# Patient Record
Sex: FEMALE | Race: WHITE | NOT HISPANIC OR LATINO | Employment: FULL TIME | URBAN - METROPOLITAN AREA
[De-identification: names, ages, dates, MRNs, and addresses within clinical notes are randomized per-mention and may not be internally consistent; named-entity substitution may affect disease eponyms.]

---

## 2021-03-13 ENCOUNTER — HOSPITAL ENCOUNTER (EMERGENCY)
Facility: HOSPITAL | Age: 36
Discharge: LEFT BEFORE DISCHARGE | End: 2021-03-13

## 2021-03-13 VITALS — HEART RATE: 88 BPM | OXYGEN SATURATION: 97 % | HEIGHT: 72 IN | TEMPERATURE: 97.2 F | RESPIRATION RATE: 18 BRPM

## 2021-03-13 PROCEDURE — 99211 OFF/OP EST MAY X REQ PHY/QHP: CPT

## 2021-03-13 NOTE — ED TRIAGE NOTES
Pt from home for dog bite. She states the dog is hers.  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

## 2021-03-13 NOTE — ED NOTES
Pt states she wants to go to another hospital.  Pt is alert and oriented with no signs of distress.      Elizabeth Mayorga, RN  03/13/21 0744

## 2024-07-25 ENCOUNTER — HOSPITAL ENCOUNTER (EMERGENCY)
Facility: HOSPITAL | Age: 39
Discharge: HOME | End: 2024-07-25
Attending: EMERGENCY MEDICINE
Payer: COMMERCIAL

## 2024-07-25 VITALS
DIASTOLIC BLOOD PRESSURE: 95 MMHG | OXYGEN SATURATION: 99 % | BODY MASS INDEX: 38.6 KG/M2 | HEART RATE: 92 BPM | RESPIRATION RATE: 16 BRPM | WEIGHT: 285 LBS | TEMPERATURE: 98.5 F | SYSTOLIC BLOOD PRESSURE: 158 MMHG | HEIGHT: 72 IN

## 2024-07-25 DIAGNOSIS — R35.0 URINARY FREQUENCY: Primary | ICD-10-CM

## 2024-07-25 LAB
ALBUMIN SERPL-MCNC: 4.5 G/DL (ref 3.5–5.2)
ALBUMIN/GLOB SERPL: 1.7 G/DL
ALP SERPL-CCNC: 71 U/L (ref 39–117)
ALT SERPL W P-5'-P-CCNC: 14 U/L (ref 1–33)
ANION GAP SERPL CALCULATED.3IONS-SCNC: 8.9 MMOL/L (ref 5–15)
AST SERPL-CCNC: 17 U/L (ref 1–32)
B-HCG UR QL: NEGATIVE
BASOPHILS # BLD AUTO: 0.02 10*3/MM3 (ref 0–0.2)
BASOPHILS NFR BLD AUTO: 0.4 % (ref 0–1.5)
BILIRUB SERPL-MCNC: 0.5 MG/DL (ref 0–1.2)
BILIRUB UR QL STRIP: NEGATIVE
BUN SERPL-MCNC: 16 MG/DL (ref 6–20)
BUN/CREAT SERPL: 18.8 (ref 7–25)
CALCIUM SPEC-SCNC: 9.7 MG/DL (ref 8.6–10.5)
CHLORIDE SERPL-SCNC: 103 MMOL/L (ref 98–107)
CLARITY UR: CLEAR
CO2 SERPL-SCNC: 27.1 MMOL/L (ref 22–29)
COLOR UR: YELLOW
CREAT SERPL-MCNC: 0.85 MG/DL (ref 0.57–1)
DEPRECATED RDW RBC AUTO: 42.2 FL (ref 37–54)
EGFRCR SERPLBLD CKD-EPI 2021: 88.9 ML/MIN/1.73
EOSINOPHIL # BLD AUTO: 0.03 10*3/MM3 (ref 0–0.4)
EOSINOPHIL NFR BLD AUTO: 0.6 % (ref 0.3–6.2)
ERYTHROCYTE [DISTWIDTH] IN BLOOD BY AUTOMATED COUNT: 12.1 % (ref 12.3–15.4)
FLUAV SUBTYP SPEC NAA+PROBE: NOT DETECTED
FLUBV RNA ISLT QL NAA+PROBE: NOT DETECTED
GLOBULIN UR ELPH-MCNC: 2.6 GM/DL
GLUCOSE SERPL-MCNC: 111 MG/DL (ref 65–99)
GLUCOSE UR STRIP-MCNC: NEGATIVE MG/DL
HCT VFR BLD AUTO: 41.8 % (ref 34–46.6)
HGB BLD-MCNC: 13.7 G/DL (ref 12–15.9)
HGB UR QL STRIP.AUTO: NEGATIVE
IMM GRANULOCYTES # BLD AUTO: 0.01 10*3/MM3 (ref 0–0.05)
IMM GRANULOCYTES NFR BLD AUTO: 0.2 % (ref 0–0.5)
KETONES UR QL STRIP: NEGATIVE
LEUKOCYTE ESTERASE UR QL STRIP.AUTO: NEGATIVE
LIPASE SERPL-CCNC: 35 U/L (ref 13–60)
LYMPHOCYTES # BLD AUTO: 1.54 10*3/MM3 (ref 0.7–3.1)
LYMPHOCYTES NFR BLD AUTO: 29.1 % (ref 19.6–45.3)
MCH RBC QN AUTO: 30.8 PG (ref 26.6–33)
MCHC RBC AUTO-ENTMCNC: 32.8 G/DL (ref 31.5–35.7)
MCV RBC AUTO: 93.9 FL (ref 79–97)
MONOCYTES # BLD AUTO: 0.32 10*3/MM3 (ref 0.1–0.9)
MONOCYTES NFR BLD AUTO: 6 % (ref 5–12)
NEUTROPHILS NFR BLD AUTO: 3.37 10*3/MM3 (ref 1.7–7)
NEUTROPHILS NFR BLD AUTO: 63.7 % (ref 42.7–76)
NITRITE UR QL STRIP: NEGATIVE
PH UR STRIP.AUTO: 6.5 [PH] (ref 5–8)
PLATELET # BLD AUTO: 264 10*3/MM3 (ref 140–450)
PMV BLD AUTO: 9.4 FL (ref 6–12)
POTASSIUM SERPL-SCNC: 4 MMOL/L (ref 3.5–5.2)
PROT SERPL-MCNC: 7.1 G/DL (ref 6–8.5)
PROT UR QL STRIP: NEGATIVE
RBC # BLD AUTO: 4.45 10*6/MM3 (ref 3.77–5.28)
SARS-COV-2 RNA RESP QL NAA+PROBE: NOT DETECTED
SODIUM SERPL-SCNC: 139 MMOL/L (ref 136–145)
SP GR UR STRIP: 1.01 (ref 1–1.03)
UROBILINOGEN UR QL STRIP: NORMAL
WBC NRBC COR # BLD AUTO: 5.29 10*3/MM3 (ref 3.4–10.8)

## 2024-07-25 PROCEDURE — 99283 EMERGENCY DEPT VISIT LOW MDM: CPT

## 2024-07-25 PROCEDURE — 81025 URINE PREGNANCY TEST: CPT | Performed by: NURSE PRACTITIONER

## 2024-07-25 PROCEDURE — 87636 SARSCOV2 & INF A&B AMP PRB: CPT | Performed by: NURSE PRACTITIONER

## 2024-07-25 PROCEDURE — 80053 COMPREHEN METABOLIC PANEL: CPT | Performed by: NURSE PRACTITIONER

## 2024-07-25 PROCEDURE — 83690 ASSAY OF LIPASE: CPT | Performed by: NURSE PRACTITIONER

## 2024-07-25 PROCEDURE — 85025 COMPLETE CBC W/AUTO DIFF WBC: CPT | Performed by: NURSE PRACTITIONER

## 2024-07-25 PROCEDURE — 81003 URINALYSIS AUTO W/O SCOPE: CPT | Performed by: NURSE PRACTITIONER

## 2024-07-25 PROCEDURE — 99283 EMERGENCY DEPT VISIT LOW MDM: CPT | Performed by: NURSE PRACTITIONER

## 2024-07-25 RX ORDER — SODIUM CHLORIDE 0.9 % (FLUSH) 0.9 %
10 SYRINGE (ML) INJECTION AS NEEDED
Status: DISCONTINUED | OUTPATIENT
Start: 2024-07-25 | End: 2024-07-25 | Stop reason: HOSPADM

## 2024-07-25 NOTE — Clinical Note
[facility]  [address]  [place] [address]    Ximena Cruz was seen and treated in our emergency department on 7/25/2024.  She may return to work on 07/26/2024.         Thank you for choosing [facility].    Angie Dai APRN

## 2024-07-25 NOTE — DISCHARGE INSTRUCTIONS
Urinalysis normal today.  No evidence of UTI    Basic labs normal.    Rest today, drink plenty of fluids.    Return Precautions    Although you are being discharged from the ED today, I encourage you to return for worsening symptoms.  Things can, and do, change such that treatment at home with medication may not be adequate.      Specifically, return for any of the following:    Chest pain, shortness of breath, pain or nausea and vomiting not controlled by medications provided.    Please make a follow up with your Primary Care Provider for a blood pressure recheck.

## 2024-07-25 NOTE — FSED PROVIDER NOTE
EMERGENCY DEPARTMENT ENCOUNTER    Room Number:  12/12  Date seen:  7/25/2024  Time seen: 11:52 EDT  PCP: Marian Graham APRN  Historian: patient    Discussed/obtained information from independent historians: n/a    HPI:  Chief complaint:urinary frequency  A complete HPI/ROS/PMH/PSH/SH/FH are unobtainable due to: n/a  Context:Ximena Cruz is a 38 y.o. female who presents to the ED with c/o acute onset of urinary frequency that she noticed this am. States last night she had very mild lower abdominal discomfort and today she has had low grade fever and just doesn't feel right.  Denies vomiting, diarrhea, headache/body aches. Did a covid test at home PTA and it was negative.  Denies vaginal discharge or concern for pregnancy.     External (non-ED) record review: Patient followed by nephrologyDr. Dao due to h/o renal angiomyolipoma.      Chronic or social conditions impacting care: n/a    ALLERGIES  Patient has no known allergies.    PAST MEDICAL HISTORY  Active Ambulatory Problems     Diagnosis Date Noted    No Active Ambulatory Problems     Resolved Ambulatory Problems     Diagnosis Date Noted    No Resolved Ambulatory Problems     No Additional Past Medical History       PAST SURGICAL HISTORY  Past Surgical History:   Procedure Laterality Date    TONSILLECTOMY      WISDOM TOOTH EXTRACTION         FAMILY HISTORY  History reviewed. No pertinent family history.    SOCIAL HISTORY  Social History     Socioeconomic History    Marital status:    Tobacco Use    Smoking status: Never       REVIEW OF SYSTEMS  Review of Systems    All systems reviewed and negative except for those discussed in HPI.     PHYSICAL EXAM    I have reviewed the triage vital signs and nursing notes.  Vitals:    07/25/24 1154   BP: 158/95   Pulse: 92   Resp: 16   Temp: 98.5 °F (36.9 °C)   SpO2: 99%     Physical Exam    GENERAL: not distressed  HENT: nares patent  EYES: no scleral icterus  NECK: no ROM limitations  CV: regular  rhythm, regular rate  RESPIRATORY: normal effort  ABDOMEN: soft, no focal tenderness.  No guarding, rebound.  : deferred  MUSCULOSKELETAL: no deformity  NEURO: alert, moves all extremities, follows commands  SKIN: warm, dry    LAB RESULTS  Recent Results (from the past 24 hour(s))   Pregnancy, Urine - Urine, Clean Catch    Collection Time: 07/25/24 11:51 AM    Specimen: Urine, Clean Catch   Result Value Ref Range    HCG, Urine QL Negative Negative   Urinalysis With Culture If Indicated - Urine, Clean Catch    Collection Time: 07/25/24 11:51 AM    Specimen: Urine, Clean Catch   Result Value Ref Range    Color, UA Yellow Yellow, Straw    Appearance, UA Clear Clear    pH, UA 6.5 5.0 - 8.0    Specific Gravity, UA 1.015 1.005 - 1.030    Glucose, UA Negative Negative    Ketones, UA Negative Negative    Bilirubin, UA Negative Negative    Blood, UA Negative Negative    Protein, UA Negative Negative    Leuk Esterase, UA Negative Negative    Nitrite, UA Negative Negative    Urobilinogen, UA 0.2 E.U./dL 0.2 - 1.0 E.U./dL   Comprehensive Metabolic Panel    Collection Time: 07/25/24 12:20 PM    Specimen: Blood   Result Value Ref Range    Glucose 111 (H) 65 - 99 mg/dL    BUN 16 6 - 20 mg/dL    Creatinine 0.85 0.57 - 1.00 mg/dL    Sodium 139 136 - 145 mmol/L    Potassium 4.0 3.5 - 5.2 mmol/L    Chloride 103 98 - 107 mmol/L    CO2 27.1 22.0 - 29.0 mmol/L    Calcium 9.7 8.6 - 10.5 mg/dL    Total Protein 7.1 6.0 - 8.5 g/dL    Albumin 4.5 3.5 - 5.2 g/dL    ALT (SGPT) 14 1 - 33 U/L    AST (SGOT) 17 1 - 32 U/L    Alkaline Phosphatase 71 39 - 117 U/L    Total Bilirubin 0.5 0.0 - 1.2 mg/dL    Globulin 2.6 gm/dL    A/G Ratio 1.7 g/dL    BUN/Creatinine Ratio 18.8 7.0 - 25.0    Anion Gap 8.9 5.0 - 15.0 mmol/L    eGFR 88.9 >60.0 mL/min/1.73   Lipase    Collection Time: 07/25/24 12:20 PM    Specimen: Blood   Result Value Ref Range    Lipase 35 13 - 60 U/L   CBC Auto Differential    Collection Time: 07/25/24 12:20 PM    Specimen: Blood    Result Value Ref Range    WBC 5.29 3.40 - 10.80 10*3/mm3    RBC 4.45 3.77 - 5.28 10*6/mm3    Hemoglobin 13.7 12.0 - 15.9 g/dL    Hematocrit 41.8 34.0 - 46.6 %    MCV 93.9 79.0 - 97.0 fL    MCH 30.8 26.6 - 33.0 pg    MCHC 32.8 31.5 - 35.7 g/dL    RDW 12.1 (L) 12.3 - 15.4 %    RDW-SD 42.2 37.0 - 54.0 fl    MPV 9.4 6.0 - 12.0 fL    Platelets 264 140 - 450 10*3/mm3    Neutrophil % 63.7 42.7 - 76.0 %    Lymphocyte % 29.1 19.6 - 45.3 %    Monocyte % 6.0 5.0 - 12.0 %    Eosinophil % 0.6 0.3 - 6.2 %    Basophil % 0.4 0.0 - 1.5 %    Immature Grans % 0.2 0.0 - 0.5 %    Neutrophils, Absolute 3.37 1.70 - 7.00 10*3/mm3    Lymphocytes, Absolute 1.54 0.70 - 3.10 10*3/mm3    Monocytes, Absolute 0.32 0.10 - 0.90 10*3/mm3    Eosinophils, Absolute 0.03 0.00 - 0.40 10*3/mm3    Basophils, Absolute 0.02 0.00 - 0.20 10*3/mm3    Immature Grans, Absolute 0.01 0.00 - 0.05 10*3/mm3   COVID-19 and FLU A/B PCR, 1 HR TAT - Swab, Nasopharynx    Collection Time: 07/25/24 12:21 PM    Specimen: Nasopharynx; Swab   Result Value Ref Range    COVID19 Not Detected Not Detected - Ref. Range    Influenza A PCR Not Detected Not Detected    Influenza B PCR Not Detected Not Detected       Ordered the above labs and independently interpreted results.  My findings will be discussed in the ED course or medical decision making section below      PROGRESS, DATA ANALYSIS, CONSULTS AND MEDICAL DECISION MAKING    Please note that this section constitutes my independent interpretation of clinical data including lab results, radiology, EKG's.  This constitutes my independent professional opinion regarding differential diagnosis and management of this patient.  It may include any factors such as history from outside sources, review of external records, social determinants of health, management of medications, response to those treatments, and discussions with other providers.    ED Course as of 07/25/24 1303   Thu Jul 25, 2024   1250 COVID19: Not Detected [EW]   1250  Influenza A PCR: Not Detected [EW]   1250 Influenza B PCR: Not Detected [EW]   1257 WBC: 5.29 [EW]      ED Course User Index  [EW] Angie Dai APRN     Orders placed during this visit:  Orders Placed This Encounter   Procedures    COVID-19 and FLU A/B PCR, 1 HR TAT - Swab, Nasopharynx    Pregnancy, Urine - Urine, Clean Catch    Urinalysis With Culture If Indicated - Urine, Clean Catch    Comprehensive Metabolic Panel    Lipase    CBC Auto Differential    Insert peripheral IV    Blood Draw With IV Start    CBC & Differential    ED Acknowledgement Form Needed;            Medical Decision Making  Problems Addressed:  Urinary frequency: complicated acute illness or injury    Amount and/or Complexity of Data Reviewed  Labs: ordered. Decision-making details documented in ED Course.    Risk  Prescription drug management.    Patient presents with urinary frequency that started today.  I considered she could have a UTI.  She has no other symptoms.  She states last night she did have some vague lower abdominal discomfort but it was very mild.  She also states that in anticipation of cutting the grass she drank extra water yesterday which could attribute to her urinary frequency.  Urinalysis is completely negative.  We did go ahead and check basic labs again which were all reassuring.  COVID flu negative.  Patient has stable vital signs and will be discharged        DIAGNOSIS  Final diagnoses:   Urinary frequency          Medication List      No changes were made to your prescriptions during this visit.         FOLLOW-UP  Marian Graham APRN  [address], [place] [address]  [phone]    Schedule an appointment as soon as possible for a visit in 1 day  As needed, If symptoms worsen        Latest Documented Vital Signs:  As of 13:03 EDT  BP- 158/95 HR- 92 Temp- 98.5 °F (36.9 °C) (Oral) O2 sat- 99%    Appropriate PPE utilized throughout this patient encounter to include mask, if indicated, per  current protocol. Hand hygiene was performed before donning PPE and after removal when leaving the room.    Please note that portions of this were completed with a voice recognition program.     Note Disclaimer: At [facility], we believe that sharing information builds trust and better relationships. You are receiving this note because you are receiving care at [facility] or recently visited. It is possible you will see health information before a provider has talked with you about it. This kind of information can be easy to misunderstand. To help you fully understand what it means for your health, we urge you to discuss this note with your provider.

## 2025-06-11 ENCOUNTER — OFFICE VISIT (OUTPATIENT)
Dept: FAMILY MEDICINE CLINIC | Facility: CLINIC | Age: 40
End: 2025-06-11
Payer: COMMERCIAL

## 2025-06-11 VITALS
SYSTOLIC BLOOD PRESSURE: 130 MMHG | DIASTOLIC BLOOD PRESSURE: 90 MMHG | TEMPERATURE: 96.4 F | OXYGEN SATURATION: 98 % | HEIGHT: 72 IN | WEIGHT: 293 LBS | HEART RATE: 78 BPM | RESPIRATION RATE: 16 BRPM | BODY MASS INDEX: 39.68 KG/M2

## 2025-06-11 DIAGNOSIS — D17.71 RENAL ANGIOLIPOMA: ICD-10-CM

## 2025-06-11 DIAGNOSIS — J45.990 EXERCISE-INDUCED ASTHMA: ICD-10-CM

## 2025-06-11 DIAGNOSIS — F41.9 ANXIETY: Primary | ICD-10-CM

## 2025-06-11 DIAGNOSIS — R03.0 WHITE COAT SYNDROME WITHOUT DIAGNOSIS OF HYPERTENSION: ICD-10-CM

## 2025-06-11 DIAGNOSIS — Z00.00 ROUTINE ADULT HEALTH MAINTENANCE: ICD-10-CM

## 2025-06-11 DIAGNOSIS — J30.2 SEASONAL ALLERGIES: ICD-10-CM

## 2025-06-11 RX ORDER — ALBUTEROL SULFATE 90 UG/1
2 INHALANT RESPIRATORY (INHALATION) EVERY 4 HOURS PRN
COMMUNITY

## 2025-06-11 NOTE — PROGRESS NOTES
Chief Complaint   Patient presents with    [facility]     Pt here to establish care, no other concerns, needing pap and physical in September      History of Present Illness:    History of Present Illness  The patient is a 39-year-old female who presents to [facility].    She has been diagnosed with an angiomyolipoma on her kidney and was under the care of Dr. Diane for approximately one year. Periodic ultrasounds confirmed the stability of the condition, and she was advised that future blood work would indicate any necessary follow-up. This advice was given 6 months to a year ago.    She experiences anxiety, which has been intermittently treated, but she is not currently on any medication. She has previously taken Lexapro for anxiety but discontinued it several years ago. Currently, she reports feeling well and has not sought further treatment. She has consulted with an online doctor regarding energy work as an alternative to medication, which she prefers. She has been practicing emotional freedom techniques and various breathing exercises, which she finds beneficial.    She has a history of migraines, for which her previous physician prescribed Relpax as needed. She still has the original prescription from 2 years ago and has only taken 2 tablets, as the migraines do not follow a consistent pattern.    She also has seasonal allergies, for which she takes Claritin as needed and uses Flonase.     She has experienced elevated blood pressure during doctor's visits, likely due to anxiety. Dr. Diane recommended home monitoring of her blood pressure for 3 months at various times of the day, but no consistent trends were observed. Her mother has hypertension and is on medication for it.    She has experienced episodes of abdominal pain and nausea, which were previously attributed to inflammation and treated with pantoprazole. She took this medication for a few weeks until symptoms resolved. It was later  determined that the turmeric in a supplement she was taking may have been the trigger. A similar episode occurred a year prior when she was consuming coffee and lentil soup, leading to the suspicion that a foreign body may have been lodged and subsequently passed.    She experiences exercise-induced asthma, characterized by coughing and chest tightness during running or jogging in dry conditions. These symptoms are relieved by using an albuterol inhaler.    She maintains a regular exercise routine, aiming for 10,000 steps daily as part of a workplace competition. She is making efforts to improve her diet, focusing on protein and salads, and is working on mindful eating practices. She is up to date with her dental exams, which she attends every 6 months at [facility]. She also sees an eye doctor annually and a specialist for a small retinal tear. Her last mammogram was in mid-December 2024, and the previous one was in December 2023. Her last Pap smear was in 2022, and she is due for another this year. She is uncertain about her hepatitis C screening status. She reports normal bowel movements without diarrhea, constipation, or blood in stool. She takes a probiotic supplement for digestive health.     ROS: She reports no urinary symptoms or joint pain or swelling. She has been experiencing soreness in her arm for about a month, which she attributes to a pulled muscle, and is managing with a brace.    FAMILY HISTORY  Her mother has high blood pressure and is on medication for it.      Outpatient Medications Prior to Visit   Medication Sig Dispense Refill    albuterol sulfate  (90 Base) MCG/ACT inhaler Inhale 2 puffs Every 4 (Four) Hours As Needed for Wheezing.      fluticasone (FLONASE) 50 MCG/ACT nasal spray       Vienva 0.1-20 MG-MCG per tablet       acetaminophen-codeine (TYLENOL/CODEINE #4) 300-60 MG per tablet Take 1 tablet by mouth Every 6 (Six) Hours As Needed for Moderate Pain . 12  "tablet 0    escitalopram (LEXAPRO) 20 MG tablet       Fexofenadine HCl (ALLEGRA PO) Take  by mouth.      fluticasone (FLONASE) 50 MCG/ACT nasal spray Administer 2 sprays into the nostril(s) as directed by provider Daily for 30 days. 16 g 0    loratadine (Claritin) 10 MG tablet Take 1 tablet by mouth Daily.       No facility-administered medications prior to visit.      Allergies   Allergen Reactions    Tetrahydrozoline-Zn Sulfate Itching and Other (See Comments)     Past Surgical History:   Procedure Laterality Date    TONSILLECTOMY      WISDOM TOOTH EXTRACTION       family history includes Anxiety disorder in her father; Asthma in her father; Cancer in her paternal grandfather; Heart disease in her paternal grandfather; Hypertension in her mother; Liver disease in her mother; No Known Problems in her maternal grandfather, maternal grandmother, and paternal grandmother.   reports that she quit smoking about 11 years ago. Her smoking use included cigarettes. She started smoking about 21 years ago. She has a 10 pack-year smoking history. She has never been exposed to tobacco smoke. She has never used smokeless tobacco. She reports current alcohol use. She reports that she does not use drugs.     /90 (BP Location: Right arm, Patient Position: Sitting, Cuff Size: Adult)   Pulse 78   Temp 96.4 °F (35.8 °C) (Temporal)   Resp 16   Ht 182.9 cm (72.01\")   Wt (!) 136 kg (300 lb 1.6 oz)   LMP 05/18/2025 (Approximate)   SpO2 98%   BMI 40.69 kg/m²   Physical Exam  Constitutional:       Appearance: Normal appearance.   HENT:      Head: Normocephalic and atraumatic.      Mouth/Throat:      Mouth: Mucous membranes are moist.   Eyes:      Conjunctiva/sclera: Conjunctivae normal.   Cardiovascular:      Rate and Rhythm: Normal rate and regular rhythm.      Pulses: Normal pulses.      Heart sounds: Normal heart sounds.   Pulmonary:      Effort: Pulmonary effort is normal.      Breath sounds: Normal breath sounds. "   Abdominal:      General: Bowel sounds are normal.      Palpations: Abdomen is soft.   Neurological:      Mental Status: She is alert and oriented to person, place, and time.   Psychiatric:         Mood and Affect: Mood normal.                   Diagnoses and all orders for this visit:    1. Anxiety (Primary)    2. Exercise-induced asthma    3. White coat syndrome without diagnosis of hypertension    4. Seasonal allergies    5. Renal angiolipoma    6. Routine adult health maintenance  -     CBC & Differential; Future  -     Comprehensive Metabolic Panel; Future  -     Hepatitis C Antibody; Future  -     Hemoglobin A1c; Future  -     Lipid Panel With / Chol / HDL Ratio; Future  -     TSH Rfx On Abnormal To Free T4; Future  -     Urinalysis With Microscopic - Urine, Clean Catch; Future         Assessment & Plan  1. Health maintenance.  - Up to date with dental and vision exams.  - Last mammogram in mid-December 2024, last Pap smear in 2022; due for Pap smear this year.  - Comprehensive blood work panel to be ordered prior to physical examination in October 2025.  - Pap smear to be conducted during the October 2025 visit.    2. Anxiety.  - Treated for anxiety on and off, currently not on medication.  - Reports feeling okay, using emotional freedom techniques and breathing exercises.  - Prefers non-medication approaches for anxiety management.    3. Migraines.  - Past prescription for Relpax (eletriptan), used only twice in the last two years.  - No current migraine issues reported.  - Continues to have Relpax available for use as needed.    4. Seasonal allergies.  - Takes Claritin (loratadine) as needed, uses Flonase if necessary.  - No specific allergens identified.  - Manages symptoms effectively with current regimen.    5. Elevated blood pressure.  - Experiences elevated blood pressure primarily during doctor visits due to anxiety.  - Home monitoring showed no trends of hypertension.  - No current medication needed  for blood pressure management.    6. Gastroesophageal reflux disease (GERD).  - Past episodes of abdominal pain and nausea managed with pantoprazole.  - Symptoms resolved after discontinuing a turmeric supplement.  - No current GERD symptoms reported.    7. Exercise-induced asthma.  - Experiences asthma symptoms when running in dry conditions.  - Uses albuterol inhaler as needed.  - Symptoms managed effectively with current inhaler use.    8. Angiomyolipoma.  - Monitored by Dr. Diane with periodic ultrasounds, no current issues reported.  - Last follow-up was 6 months to a year ago, no further follow-up needed unless indicated by blood work.    Follow-up  - Scheduled for physical examination in October 2025.      Patient or patient representative verbalized consent for the use of Ambient Listening during the visit with  Jojo Irvin MD for chart documentation. 6/11/2025  08:57 EDT

## 2025-06-16 ENCOUNTER — PATIENT ROUNDING (BHMG ONLY) (OUTPATIENT)
Dept: FAMILY MEDICINE CLINIC | Facility: CLINIC | Age: 40
End: 2025-06-16
Payer: COMMERCIAL